# Patient Record
Sex: MALE | Race: WHITE | NOT HISPANIC OR LATINO | Employment: FULL TIME | URBAN - METROPOLITAN AREA
[De-identification: names, ages, dates, MRNs, and addresses within clinical notes are randomized per-mention and may not be internally consistent; named-entity substitution may affect disease eponyms.]

---

## 2018-06-19 ENCOUNTER — OFFICE VISIT (OUTPATIENT)
Dept: FAMILY MEDICINE CLINIC | Facility: CLINIC | Age: 48
End: 2018-06-19
Payer: COMMERCIAL

## 2018-06-19 VITALS
WEIGHT: 218.38 LBS | OXYGEN SATURATION: 98 % | HEIGHT: 75 IN | SYSTOLIC BLOOD PRESSURE: 122 MMHG | BODY MASS INDEX: 27.15 KG/M2 | HEART RATE: 74 BPM | DIASTOLIC BLOOD PRESSURE: 82 MMHG | RESPIRATION RATE: 18 BRPM

## 2018-06-19 DIAGNOSIS — Z23 NEED FOR TDAP VACCINATION: ICD-10-CM

## 2018-06-19 DIAGNOSIS — Z76.89 ENCOUNTER TO ESTABLISH CARE: Primary | ICD-10-CM

## 2018-06-19 PROCEDURE — 3008F BODY MASS INDEX DOCD: CPT | Performed by: NURSE PRACTITIONER

## 2018-06-19 PROCEDURE — 99204 OFFICE O/P NEW MOD 45 MIN: CPT | Performed by: NURSE PRACTITIONER

## 2018-06-19 PROCEDURE — 1036F TOBACCO NON-USER: CPT | Performed by: NURSE PRACTITIONER

## 2018-06-19 PROCEDURE — 90471 IMMUNIZATION ADMIN: CPT

## 2018-06-19 PROCEDURE — 90715 TDAP VACCINE 7 YRS/> IM: CPT

## 2018-06-19 NOTE — PROGRESS NOTES
Assessment/Plan:    No problem-specific Assessment & Plan notes found for this encounter  Diagnoses and all orders for this visit:    Encounter to establish care          Subjective:      Patient ID: Shiva Hightower is a 53 y o  male  Pt is here to establish Care  He was last seen by a doctor in 2003  He feels well  NO issues or concerns  Pt does not want lab work  He works as an   He was cut by a nail at home within past month  He would like a tetanus shot  The following portions of the patient's history were reviewed and updated as appropriate:   He  has no past medical history on file  He   Patient Active Problem List    Diagnosis Date Noted    Encounter to establish care 06/19/2018     He  has no past surgical history on file  His family history includes Heart attack in his father  He  reports that he has quit smoking  He uses smokeless tobacco  He reports that he drinks alcohol  He reports that he does not use drugs  No current outpatient prescriptions on file  No current facility-administered medications for this visit  No current outpatient prescriptions on file prior to visit  No current facility-administered medications on file prior to visit  He has No Known Allergies       Review of Systems   Constitutional: Negative for fatigue and fever  HENT: Negative for congestion  Eyes: Negative for visual disturbance  Respiratory: Negative for cough and shortness of breath  Cardiovascular: Negative for chest pain, palpitations and leg swelling  Gastrointestinal: Negative for abdominal distention and abdominal pain  Endocrine: Negative for cold intolerance, polydipsia and polyuria  Genitourinary: Negative for difficulty urinating  Musculoskeletal: Negative for back pain and joint swelling  Skin: Negative for color change and rash  Allergic/Immunologic: Negative for immunocompromised state  Neurological: Negative for dizziness and headaches  Hematological: Negative for adenopathy  Psychiatric/Behavioral: Negative for behavioral problems and sleep disturbance  All other systems reviewed and are negative  Objective:      /82 (BP Location: Left arm, Patient Position: Sitting)   Pulse 74   Resp 18   Ht 6' 3" (1 905 m)   Wt 99 1 kg (218 lb 6 oz)   SpO2 98%   BMI 27 30 kg/m²          Physical Exam   Constitutional: He is oriented to person, place, and time  He appears well-developed and well-nourished  HENT:   Head: Normocephalic and atraumatic  Mouth/Throat: Oropharynx is clear and moist    Eyes: Conjunctivae and EOM are normal  Pupils are equal, round, and reactive to light  Neck: Normal range of motion  Cardiovascular: Normal rate, regular rhythm and normal heart sounds  Exam reveals no gallop and no friction rub  No murmur heard  Pulmonary/Chest: Effort normal and breath sounds normal  No respiratory distress  Abdominal: Soft  There is no tenderness  Musculoskeletal: Normal range of motion  Lymphadenopathy:     He has no cervical adenopathy  Neurological: He is alert and oriented to person, place, and time  Skin: Skin is warm and dry  Psychiatric: He has a normal mood and affect  His behavior is normal  Judgment and thought content normal    Nursing note and vitals reviewed

## 2020-11-18 ENCOUNTER — OFFICE VISIT (OUTPATIENT)
Dept: FAMILY MEDICINE CLINIC | Facility: CLINIC | Age: 50
End: 2020-11-18
Payer: COMMERCIAL

## 2020-11-18 VITALS
HEART RATE: 87 BPM | RESPIRATION RATE: 18 BRPM | HEIGHT: 75 IN | DIASTOLIC BLOOD PRESSURE: 78 MMHG | OXYGEN SATURATION: 97 % | BODY MASS INDEX: 26.86 KG/M2 | SYSTOLIC BLOOD PRESSURE: 126 MMHG | WEIGHT: 216 LBS | TEMPERATURE: 98.4 F

## 2020-11-18 DIAGNOSIS — Z12.5 SCREENING FOR PROSTATE CANCER: ICD-10-CM

## 2020-11-18 DIAGNOSIS — Z83.3 FAMILY HISTORY OF DIABETES MELLITUS: ICD-10-CM

## 2020-11-18 DIAGNOSIS — N52.8 OTHER MALE ERECTILE DYSFUNCTION: Primary | ICD-10-CM

## 2020-11-18 DIAGNOSIS — Z13.220 LIPID SCREENING: ICD-10-CM

## 2020-11-18 PROCEDURE — 3008F BODY MASS INDEX DOCD: CPT | Performed by: NURSE PRACTITIONER

## 2020-11-18 PROCEDURE — 1036F TOBACCO NON-USER: CPT | Performed by: NURSE PRACTITIONER

## 2020-11-18 PROCEDURE — 3725F SCREEN DEPRESSION PERFORMED: CPT | Performed by: NURSE PRACTITIONER

## 2020-11-18 PROCEDURE — 99214 OFFICE O/P EST MOD 30 MIN: CPT | Performed by: NURSE PRACTITIONER

## 2020-11-18 RX ORDER — SILDENAFIL 50 MG/1
50 TABLET, FILM COATED ORAL DAILY PRN
Qty: 10 TABLET | Refills: 2 | Status: SHIPPED | OUTPATIENT
Start: 2020-11-18 | End: 2021-01-12

## 2020-11-23 ENCOUNTER — LAB (OUTPATIENT)
Dept: LAB | Facility: CLINIC | Age: 50
End: 2020-11-23
Payer: COMMERCIAL

## 2020-11-23 DIAGNOSIS — N52.8 OTHER MALE ERECTILE DYSFUNCTION: ICD-10-CM

## 2020-11-23 DIAGNOSIS — Z13.220 LIPID SCREENING: ICD-10-CM

## 2020-11-23 DIAGNOSIS — Z12.5 SCREENING FOR PROSTATE CANCER: ICD-10-CM

## 2020-11-23 DIAGNOSIS — Z83.3 FAMILY HISTORY OF DIABETES MELLITUS: ICD-10-CM

## 2020-11-23 LAB
ALBUMIN SERPL BCP-MCNC: 3.7 G/DL (ref 3.5–5)
ALP SERPL-CCNC: 62 U/L (ref 46–116)
ALT SERPL W P-5'-P-CCNC: 26 U/L (ref 12–78)
ANION GAP SERPL CALCULATED.3IONS-SCNC: 4 MMOL/L (ref 4–13)
AST SERPL W P-5'-P-CCNC: 16 U/L (ref 5–45)
BILIRUB SERPL-MCNC: 0.65 MG/DL (ref 0.2–1)
BUN SERPL-MCNC: 20 MG/DL (ref 5–25)
CALCIUM SERPL-MCNC: 9.4 MG/DL (ref 8.3–10.1)
CHLORIDE SERPL-SCNC: 105 MMOL/L (ref 100–108)
CHOLEST SERPL-MCNC: 130 MG/DL (ref 50–200)
CO2 SERPL-SCNC: 32 MMOL/L (ref 21–32)
CREAT SERPL-MCNC: 1.05 MG/DL (ref 0.6–1.3)
EST. AVERAGE GLUCOSE BLD GHB EST-MCNC: 100 MG/DL
GFR SERPL CREATININE-BSD FRML MDRD: 83 ML/MIN/1.73SQ M
GLUCOSE P FAST SERPL-MCNC: 95 MG/DL (ref 65–99)
HBA1C MFR BLD: 5.1 %
HDLC SERPL-MCNC: 58 MG/DL
LDLC SERPL CALC-MCNC: 62 MG/DL (ref 0–100)
NONHDLC SERPL-MCNC: 72 MG/DL
POTASSIUM SERPL-SCNC: 4.4 MMOL/L (ref 3.5–5.3)
PROT SERPL-MCNC: 7.4 G/DL (ref 6.4–8.2)
PSA SERPL-MCNC: 0.9 NG/ML (ref 0–4)
SODIUM SERPL-SCNC: 141 MMOL/L (ref 136–145)
TRIGL SERPL-MCNC: 52 MG/DL
TSH SERPL DL<=0.05 MIU/L-ACNC: 1.32 UIU/ML (ref 0.36–3.74)

## 2020-11-23 PROCEDURE — 80061 LIPID PANEL: CPT

## 2020-11-23 PROCEDURE — 36415 COLL VENOUS BLD VENIPUNCTURE: CPT

## 2020-11-23 PROCEDURE — 83036 HEMOGLOBIN GLYCOSYLATED A1C: CPT

## 2020-11-23 PROCEDURE — G0103 PSA SCREENING: HCPCS

## 2020-11-23 PROCEDURE — 84443 ASSAY THYROID STIM HORMONE: CPT

## 2020-11-23 PROCEDURE — 80053 COMPREHEN METABOLIC PANEL: CPT

## 2020-11-24 ENCOUNTER — LAB (OUTPATIENT)
Dept: LAB | Facility: CLINIC | Age: 50
End: 2020-11-24
Payer: COMMERCIAL

## 2020-11-24 DIAGNOSIS — N52.8 OTHER MALE ERECTILE DYSFUNCTION: ICD-10-CM

## 2020-11-24 PROCEDURE — 36415 COLL VENOUS BLD VENIPUNCTURE: CPT

## 2020-11-24 PROCEDURE — 84403 ASSAY OF TOTAL TESTOSTERONE: CPT

## 2020-11-24 PROCEDURE — 84402 ASSAY OF FREE TESTOSTERONE: CPT

## 2020-11-25 LAB
TESTOST FREE SERPL-MCNC: 13.1 PG/ML (ref 6.8–21.5)
TESTOST SERPL-MCNC: 460 NG/DL (ref 264–916)

## 2020-11-27 DIAGNOSIS — N52.8 OTHER MALE ERECTILE DYSFUNCTION: Primary | ICD-10-CM

## 2021-01-12 DIAGNOSIS — N52.8 OTHER MALE ERECTILE DYSFUNCTION: ICD-10-CM

## 2021-01-12 RX ORDER — SILDENAFIL 50 MG/1
TABLET, FILM COATED ORAL
Qty: 10 TABLET | Refills: 2 | Status: SHIPPED | OUTPATIENT
Start: 2021-01-12 | End: 2021-04-15

## 2021-04-15 DIAGNOSIS — N52.8 OTHER MALE ERECTILE DYSFUNCTION: ICD-10-CM

## 2021-04-15 RX ORDER — SILDENAFIL 50 MG/1
TABLET, FILM COATED ORAL
Qty: 10 TABLET | Refills: 2 | Status: SHIPPED | OUTPATIENT
Start: 2021-04-15 | End: 2021-07-07

## 2021-07-07 DIAGNOSIS — N52.8 OTHER MALE ERECTILE DYSFUNCTION: ICD-10-CM

## 2021-07-07 RX ORDER — SILDENAFIL 50 MG/1
TABLET, FILM COATED ORAL
Qty: 10 TABLET | Refills: 2 | Status: SHIPPED | OUTPATIENT
Start: 2021-07-07 | End: 2021-09-13

## 2021-09-13 DIAGNOSIS — N52.8 OTHER MALE ERECTILE DYSFUNCTION: ICD-10-CM

## 2021-09-13 RX ORDER — SILDENAFIL 50 MG/1
TABLET, FILM COATED ORAL
Qty: 10 TABLET | Refills: 2 | Status: SHIPPED | OUTPATIENT
Start: 2021-09-13

## 2023-04-07 ENCOUNTER — TELEPHONE (OUTPATIENT)
Dept: UROLOGY | Facility: AMBULATORY SURGERY CENTER | Age: 53
End: 2023-04-07

## 2023-04-07 NOTE — TELEPHONE ENCOUNTER
New Patient    What is the reason for the patient’s appointment? Vas consult     What office location does the patient prefer?  [place]     Imaging/Lab Results:    Do we accept the patient's insurance or is the patient Self-Pay? Yes     Insurance Provider: Michael Reese  Plan Type/Number:  Member ID#: [id number]    Has the patient had any previous Urologist(s)? No     Have patient records been requested? If not are records showing in [address] [place] [address] Rd: records in [facility]     Has the patient had any outside testing done? No     Does the patient have a personal history of cancer?  No

## 2023-05-01 ENCOUNTER — OFFICE VISIT (OUTPATIENT)
Dept: UROLOGY | Facility: CLINIC | Age: 53
End: 2023-05-01

## 2023-05-01 VITALS
WEIGHT: 196 LBS | DIASTOLIC BLOOD PRESSURE: 82 MMHG | HEART RATE: 74 BPM | BODY MASS INDEX: 24.37 KG/M2 | SYSTOLIC BLOOD PRESSURE: 136 MMHG | OXYGEN SATURATION: 96 % | HEIGHT: 75 IN

## 2023-05-01 DIAGNOSIS — Z30.09 VASECTOMY EVALUATION: Primary | ICD-10-CM

## 2023-05-01 DIAGNOSIS — Z98.52 STATUS POST VASECTOMY: ICD-10-CM

## 2023-05-01 RX ORDER — DIAZEPAM 5 MG/1
5 TABLET ORAL ONCE
Qty: 1 TABLET | Refills: 0 | Status: SHIPPED | OUTPATIENT
Start: 2023-05-01 | End: 2023-05-01

## 2023-05-01 RX ORDER — DIPHENOXYLATE HYDROCHLORIDE AND ATROPINE SULFATE 2.5; .025 MG/1; MG/1
1 TABLET ORAL DAILY
COMMUNITY

## 2023-05-01 NOTE — PROGRESS NOTES
5/1/2023      Chief Complaint   Patient presents with    Sterilization     Assessment and Plan    47 y o  male managed by NEW PATIENT    1  Encounter for Vasectomy   · Discussed shaving pre- procedure   · Valium prescription sent to pharmacy  To be taken 1 hour before procedure  PT aware on how to appropriately dose  · Semen analysis ordered post vasectomy  · Pt education material provided   ·  Scrotal/testicular exam unremarkable      The patient presents requesting elective sterilization vasectomy  We discussed that vasectomy is in operation performed in the office in order to provide elective sterilization  This procedure should be considered a permanent option  Although there are subspecialists who perform vasectomy reversals, these operations are not 100% successful and are often not covered by insurance meaning they can come with a large out-of-pocket cost  The patient understands this  We reviewed the procedure in depth  Risk and benefits of the procedure were discussed and reviewed  Informed consent was obtained in the office today  The patient was prescribed a benzodiazepine to take one hour prior to the procedure to assist with his comfort  He understands that he will require transportation to and from the office that day if he is to use the benzodiazepine  He also understands he will require  semen analysis testing at 8 weeks post procedure to ensure full sterilization  In the interim, he will require contraception during intercourse to avoid an undesired pregnancy  Usually, patients are out of work for 2-3 days  We recommend tight fitting scrotal support following the procedure along with ice packs applied to the scrotum 15 minutes on and 15 minutes off for the first 24 hours  We discussed that we do send the patient home with short course of anti-inflammatory and/or narcotic pain medication  After this discussion, the patient agrees to proceed   We will schedule him in the near future  He agrees to oral sedative prior to procedure  History of Present Illness  Linda Arce is a 47 y o  male here for evaluation for vasectomy  He denies a history of injury or trauma to the groin and scrotum or surgery  He reports a previous history of being an  as well as performing jujitsu has had multiple episodes of impact to the scrotum and testicles  He reports having 1 child and currently in a stable  relationship where both him and his wife are desiring permanent sterility  Currently they are using an IUD as a form of contraception  Patient reports working as an   He currently denies all lower urinary tract symptoms including dysuria, hematuria, Román frequency and urgency  He reports sensation of complete benefit with urination  He reports occasional use of NSAIDs but otherwise is not on any blood thinners  He denies an allergy to lidocaine, Marcaine and Betadine  Patient reports an underlying history of subtle erectile dysfunction for which he uses sildenafil occasionally  Review of Systems   Constitutional: Negative for chills and fever  Respiratory: Negative for cough and shortness of breath  Cardiovascular: Negative for chest pain  Gastrointestinal: Negative for abdominal distention, abdominal pain, blood in stool, nausea and vomiting  Genitourinary: Negative for difficulty urinating, dysuria, enuresis, flank pain, frequency, hematuria and urgency  Skin: Negative for rash             Past Medical History  Past Medical History:   Diagnosis Date    Encounter for sterilization in male        Past Social History  Past Surgical History:   Procedure Laterality Date    CIRCUMCISION      infant    WISDOM TOOTH EXTRACTION       Social History     Tobacco Use   Smoking Status Former    Passive exposure: Past   Smokeless Tobacco Current    Types: Chew       Past Family History  Family History   Problem Relation Age of Onset    Diabetes Father     Stroke Father         hx of    Heart attack Father     No Known Problems Mother     Memory loss Paternal Grandmother     No Known Problems Sister     No Known Problems Brother     Ulcerative colitis Daughter        Past Social history  Social History     Socioeconomic History    Marital status: /Civil Union     Spouse name: Not on file    Number of children: Not on file    Years of education: Not on file    Highest education level: Not on file   Occupational History    Not on file   Tobacco Use    Smoking status: Former     Passive exposure: Past    Smokeless tobacco: Current     Types: Chew   Vaping Use    Vaping Use: Some days    Substances: Flavoring   Substance and Sexual Activity    Alcohol use: Yes     Comment: rare     Drug use: No    Sexual activity: Yes   Other Topics Concern    Not on file   Social History Narrative    Not on file     Social Determinants of Health     Financial Resource Strain: Not on file   Food Insecurity: Not on file   Transportation Needs: Not on file   Physical Activity: Not on file   Stress: Not on file   Social Connections: Not on file   Intimate Partner Violence: Not on file   Housing Stability: Not on file       Current Medications  Current Outpatient Medications   Medication Sig Dispense Refill    diazepam (VALIUM) 5 mg tablet Take 1 tablet (5 mg total) by mouth 1 (one) time for 1 dose Take 1 hour prior to office procedure 1 tablet 0    multivitamin (THERAGRAN) TABS Take 1 tablet by mouth daily      patient supplied medication Supplemental powder after work outs-Protein whey and creatine      sildenafil (VIAGRA) 50 MG tablet TAKE ONE TABLET BY MOUTH EVERY DAY AS NEEDED FOR E D  10 tablet 2     No current facility-administered medications for this visit         Allergies  No Known Allergies      The following portions of the patient's history were reviewed and updated as appropriate: allergies, current medications, past medical history, "past social history, past surgical history and problem list       Vitals  Vitals:    05/01/23 1445   BP: 136/82   Pulse: 74   SpO2: 96%   Weight: 88 9 kg (196 lb)   Height: 6' 3\" (1 905 m)           Physical Exam  Physical Exam  Vitals reviewed  Constitutional:       General: He is not in acute distress  Appearance: Normal appearance  He is normal weight  HENT:      Head: Normocephalic  Pulmonary:      Effort: No respiratory distress  Breath sounds: Normal breath sounds  Abdominal:      Hernia: There is no hernia in the left inguinal area or right inguinal area  Genitourinary:     Penis: Normal and circumcised  Testes: Normal          Right: Mass, tenderness or swelling not present  Left: Mass, tenderness or swelling not present  Epididymis:      Right: Normal       Left: Normal       Comments: Right testicle smaller than left otherwise unremarkable  Lymphadenopathy:      Lower Body: No right inguinal adenopathy  No left inguinal adenopathy  Skin:     General: Skin is warm and dry  Neurological:      General: No focal deficit present  Mental Status: He is alert and oriented to person, place, and time  Psychiatric:         Mood and Affect: Mood normal          Behavior: Behavior normal          Results  No results found for this or any previous visit (from the past 1 hour(s)) ]  Lab Results   Component Value Date    PSA 0 9 11/23/2020     Lab Results   Component Value Date    CALCIUM 9 4 11/23/2020    K 4 4 11/23/2020    CO2 32 11/23/2020     11/23/2020    BUN 20 11/23/2020    CREATININE 1 05 11/23/2020     No results found for: WBC, HGB, HCT, MCV, PLT        Orders  Orders Placed This Encounter   Procedures    Semen analysis, post-vasectomy     This is a patient instruction: Please call Central Scheduling at [address] [place] [address] to make an appointment for testing  Please refer to patient instructions on the Post Vasectomy Form provided to you by your doctor   If you " have additional questions on collection of your specimen, please call the Lab Call Center at [phone]           Standing Status:   Future     Standing Expiration Date:   5/1/2024       EZEQUIEL Casas

## 2023-08-08 ENCOUNTER — PROCEDURE VISIT (OUTPATIENT)
Dept: UROLOGY | Facility: CLINIC | Age: 53
End: 2023-08-08
Payer: COMMERCIAL

## 2023-08-08 VITALS
DIASTOLIC BLOOD PRESSURE: 72 MMHG | BODY MASS INDEX: 24.37 KG/M2 | OXYGEN SATURATION: 95 % | SYSTOLIC BLOOD PRESSURE: 112 MMHG | HEART RATE: 75 BPM | WEIGHT: 196 LBS | HEIGHT: 75 IN

## 2023-08-08 DIAGNOSIS — Z30.2 ENCOUNTER FOR STERILIZATION: Primary | ICD-10-CM

## 2023-08-08 PROCEDURE — 55250 REMOVAL OF SPERM DUCT(S): CPT | Performed by: UROLOGY

## 2023-08-08 PROCEDURE — 88302 TISSUE EXAM BY PATHOLOGIST: CPT | Performed by: STUDENT IN AN ORGANIZED HEALTH CARE EDUCATION/TRAINING PROGRAM

## 2023-08-08 NOTE — PROGRESS NOTES
Vasectomy     Date/Time 8/8/2023 1:00 PM     Performed by  Mary Alice Oates MD   Authorized by Mary Alice Oates MD     Universal Protocol   Consent: Verbal consent obtained. Written consent obtained. Risks and benefits: risks, benefits and alternatives were discussed  Consent given by: patient  Patient understanding: patient states understanding of the procedure being performed  Patient consent: the patient's understanding of the procedure matches consent given  Procedure consent: procedure consent matches procedure scheduled  Required items: required blood products, implants, devices, and special equipment available  Patient identity confirmed: verbally with patient        Local anesthesia used: yes      Anesthesia: local infiltration and nerve block     Anesthesia   Local anesthesia used: yes  Local Anesthetic: lidocaine 1% without epinephrine  Anesthetic total: 10 mL     Sedation   Patient sedated: no        Specimen: yes    Culture: no   Procedure Details   Procedure Notes: With patient in the supine position a rubber band was placed on the distal penile shaft and clipped to the patient's shirt for retraction. The rubber band was removed immediately after completion of the vasectomy. Betadine was used to prep the genitalia and then lidocaine 1% without epinephrine was used to inject in the dermal plane anesthetic mixture in the midline anterior scrotum. The needle was advanced alongside the left and right vas deferens and spermatic cord blocks were accomplished using the same mixture. At this point a puncture wound was made in the anesthetized anterior midline scrotal wall and using the surgical clamp the left and right vas deferens were grasped and brought to the surface of the puncture wound.   Sharp hemostat was used to dissected away adventitia from the vas deferens with segments of the left and right vas deferens being excised approximately 1.5 cm in length using Metzenbaum scissors bilaterally. All 4 free ends of the vas deferens were cauterized using the needle tip electrode in an intraluminal fashion. The abdominal end of the vas deferens bilaterally was docked inside surrounding adventitia and a surgical clip was placed completing fascial or tissue interposition. All tissue was then replaced back through the puncture wound in the midline scrotum aside from the 2 samples of vas deferens. There is no bleeding and the patient tolerated the procedure well without complication. The patient will return to the office for physical examination should he have any ill effects from vasectomy. He is advised to continue contraception for the next 8 weeks obtain a sperm count and if the sperm count is reported to him as being 0 he can discontinue contraception otherwise maintain contraception and produce another sperm count 8 weeks thereafter. The patient expressed understanding of postoperative instructions. There were no complications.   Patient Transportation: confirmed  Patient tolerance: patient tolerated the procedure well with no immediate complications

## 2023-08-08 NOTE — PATIENT INSTRUCTIONS
Vasectomy   WHAT YOU NEED TO KNOW:   A vasectomy is a procedure to make you sterile. It is a permanent form of birth control. The vas deferens (sperm tubes) are cut so that the semen does not contain sperm. DISCHARGE INSTRUCTIONS:   Seek care immediately if:   Your incision wound comes apart. You see blood in your urine or semen. Contact your healthcare provider or surgeon if:   You have a fever. Your wound is red, swollen, or draining pus. You feel pain or burning when you urinate. You have worsening pain in your scrotum, even after you take medicine. You have questions or concerns about your condition or care. Medicines:   NSAIDs  help decrease swelling and pain or fever. This medicine is available with or without a doctor's order. NSAIDs can cause stomach bleeding or kidney problems in certain people. If you take blood thinner medicine, always ask your healthcare provider if NSAIDs are safe for you. Always read the medicine label and follow directions. Take your medicine as directed. Contact your healthcare provider if you think your medicine is not helping or if you have side effects. Tell your provider if you are allergic to any medicine. Keep a list of the medicines, vitamins, and herbs you take. Include the amounts, and when and why you take them. Bring the list or the pill bottles to follow-up visits. Carry your medicine list with you in case of an emergency. Self-care:   Care for your wound as directed. Do not shower for 24 hours. When you do shower, carefully wash the wound with soap and water. Pat the area dry gently. Do not swim or take a bath for at least 1 week. Limit activity  for at least 2 days. Do not play sports, do yard work, or lift anything heavy for at least 2 weeks. Talk to your healthcare provider about when you can return to work. Apply ice  on your scrotum for 15 to 20 minutes every hour for 2 days.  Use an ice pack, or put crushed ice in a plastic bag. Cover it with a towel. Ice helps prevent tissue damage and decreases swelling and pain. Wear an athletic supporter for at least 2 days. This will decrease pain and swelling, and protect your wound. Place a cushion such as a washcloth or small towel under your scrotum to elevate it. Do not have sex for at least 1 week. You will not be sterile right away after a vasectomy. It will take time for all the sperm to be cleared from your body. You may need to ejaculate about 20 times or wait up to 3 months for the sperm to clear. Use another form of birth control during this time. Follow up with your healthcare provider as directed: Ask when to return to have your semen checked for sperm. Write down your questions so you remember to ask them during your visits. © Copyright [employer] 2022 Information is for End User's use only and may not be sold, redistributed or otherwise used for commercial purposes. The above information is an  only. It is not intended as medical advice for individual conditions or treatments. Talk to your doctor, nurse or pharmacist before following any medical regimen to see if it is safe and effective for you.

## 2023-08-11 PROCEDURE — 88302 TISSUE EXAM BY PATHOLOGIST: CPT | Performed by: STUDENT IN AN ORGANIZED HEALTH CARE EDUCATION/TRAINING PROGRAM

## 2023-11-30 ENCOUNTER — APPOINTMENT (OUTPATIENT)
Dept: LAB | Facility: HOSPITAL | Age: 53
End: 2023-11-30
Payer: COMMERCIAL

## 2023-11-30 ENCOUNTER — TELEPHONE (OUTPATIENT)
Dept: UROLOGY | Facility: MEDICAL CENTER | Age: 53
End: 2023-11-30

## 2023-11-30 DIAGNOSIS — Z98.52 STATUS POST VASECTOMY: ICD-10-CM

## 2023-11-30 LAB
DEPRECATED CD4 CELLS/CD8 CELLS BLD: 4 ML
SPERM MOTILE SMN QL MICRO: NORMAL

## 2023-11-30 PROCEDURE — 89321 SEMEN ANAL SPERM DETECTION: CPT

## 2023-12-01 NOTE — TELEPHONE ENCOUNTER
Call placed to patient and spoke with him. Informed him of the AP recommendations. Pt is aware and thankful for the call.